# Patient Record
Sex: MALE | Race: WHITE | Employment: OTHER | URBAN - METROPOLITAN AREA
[De-identification: names, ages, dates, MRNs, and addresses within clinical notes are randomized per-mention and may not be internally consistent; named-entity substitution may affect disease eponyms.]

---

## 2021-04-01 ENCOUNTER — HOSPITAL ENCOUNTER (EMERGENCY)
Facility: CLINIC | Age: 56
Discharge: HOME | End: 2021-04-01
Attending: PHYSICIAN ASSISTANT | Admitting: PHYSICIAN ASSISTANT
Payer: COMMERCIAL

## 2021-04-01 VITALS
DIASTOLIC BLOOD PRESSURE: 97 MMHG | WEIGHT: 202.3 LBS | RESPIRATION RATE: 16 BRPM | HEART RATE: 89 BPM | TEMPERATURE: 98.6 F | SYSTOLIC BLOOD PRESSURE: 141 MMHG | OXYGEN SATURATION: 99 %

## 2021-04-01 DIAGNOSIS — M77.8 TENDINITIS OF LEFT WRIST: ICD-10-CM

## 2021-04-01 PROCEDURE — 99283 EMERGENCY DEPT VISIT LOW MDM: CPT | Performed by: PHYSICIAN ASSISTANT

## 2021-04-01 PROCEDURE — 99284 EMERGENCY DEPT VISIT MOD MDM: CPT | Performed by: PHYSICIAN ASSISTANT

## 2021-04-01 RX ORDER — ATENOLOL 25 MG/1
25 TABLET ORAL
COMMUNITY
Start: 2020-06-23

## 2021-04-01 NOTE — DISCHARGE INSTRUCTIONS
It was a pleasure working with you today!  I hope your condition improves rapidly!     Please rest your wrist as the tendons are inflamed.    Please ice the painful area for 15 minutes every 2 hours for the next few days.  Use ibuprofen 600 mg every 6 hours with food for the next 4-5 days and then as needed after that.  Wear your brace at all times in the emergency department other than when showering for the next week to support the wrist.  You can take the brace off 3-4 times per day to do some range of motion exercises with your wrist.  The orthopedic clinic will be calling you to set up a recheck appointment in a week.

## 2021-04-01 NOTE — ED TRIAGE NOTES
Pt presents with left wrist pain. No known injury. Pt states started 2 weeks ago now worsening pain 10. CMS intact. Pt having difficulty moving wrist.

## 2021-04-01 NOTE — LETTER
April 1, 2021      To Whom It May Concern:      Angel Ch was seen in our Emergency Department today, 04/01/21.  I expect his condition to improve over the next 1-2 weeks.  He may return to work on 4/1/2021 with the following restrictions:    *Limited gripping and grasping with his left hand.  *He must wear his wrist brace at all time for the next 1 week.  *No lifting over 5 pounds for the next 1 week.      Sincerely,            Noe Roy PA-C

## 2021-04-01 NOTE — ED PROVIDER NOTES
History     Chief Complaint   Patient presents with     Wrist Pain     Left wrist pain. No known injury.      HPI  Angel Ch is a 55 year old male who presents for evaluation of left wrist discomfort for the past 2 weeks.  Worsening over time.  Worse with any movement.  Does not really have much pain at rest.  Right-hand-dominant.  He works as a .  Currently rates his pain 8 on a scale of 10 and throbbing in nature.  Has not attempted any treatment to this point.  Rest seems to make it better.  Has never had this type issue before.  Denies any numbness or tingling of the hand.  Denies any neck pain or radicular type symptoms.        Allergies:  Allergies   Allergen Reactions     No Known Allergies        Problem List:    There are no active problems to display for this patient.       Past Medical History:    No past medical history on file.    Past Surgical History:    No past surgical history on file.    Family History:    No family history on file.    Social History:  Marital Status:  Single [1]  Social History     Tobacco Use     Smoking status: Not on file   Substance Use Topics     Alcohol use: Not on file     Drug use: Not on file        Medications:    atenolol (TENORMIN) 25 MG tablet  cholecalciferol 50 MCG (2000 UT) CAPS          Review of Systems   All other systems reviewed and are negative.      Physical Exam   BP: (!) 141/97  Pulse: 89  Temp: 98.6  F (37  C)  Resp: 16  Weight: 91.8 kg (202 lb 4.8 oz)  SpO2: 99 %      Physical Exam  Vitals signs and nursing note reviewed.   Constitutional:       General: He is not in acute distress.     Appearance: He is not diaphoretic.   HENT:      Head: Normocephalic and atraumatic.      Right Ear: External ear normal.      Left Ear: External ear normal.      Nose: Nose normal.      Mouth/Throat:      Pharynx: No oropharyngeal exudate.   Eyes:      General: No scleral icterus.        Right eye: No discharge.         Left eye: No discharge.       Conjunctiva/sclera: Conjunctivae normal.      Pupils: Pupils are equal, round, and reactive to light.   Neck:      Musculoskeletal: Normal range of motion and neck supple.      Thyroid: No thyromegaly.   Cardiovascular:      Rate and Rhythm: Normal rate and regular rhythm.      Heart sounds: Normal heart sounds. No murmur.   Pulmonary:      Effort: Pulmonary effort is normal. No respiratory distress.      Breath sounds: Normal breath sounds. No wheezing or rales.   Chest:      Chest wall: No tenderness.   Abdominal:      General: Bowel sounds are normal. There is no distension.      Palpations: Abdomen is soft. There is no mass.      Tenderness: There is no abdominal tenderness. There is no guarding or rebound.   Musculoskeletal: Normal range of motion.         General: No tenderness or deformity.      Comments: Left arm with no obvious abnormality.  Normal active and passive range of motion.  Has pain with forced extension and supination of the wrist.  No crepitus.  No wrist or hand discomfort.  Negative Phalen's and Tinel's sign.  Negative Finkelstein's test.  Elbow exam is completely normal.  Normal range of motion and no tenderness.  Shoulder exam is completely normal with normal range of motion and no tenderness as well.  Cervical range of motion completely normal.  No pain with axial loading.  Nontender to palpation to the paravertebral musculature as well as the spinous processes.  No upper extremity weakness identified.  Sensation intact to light touch throughout.  Radial and ulnar pulses are 2+.   Lymphadenopathy:      Cervical: No cervical adenopathy.   Skin:     General: Skin is warm and dry.      Capillary Refill: Capillary refill takes less than 2 seconds.      Findings: No erythema or rash.   Neurological:      Mental Status: He is alert and oriented to person, place, and time.      Cranial Nerves: No cranial nerve deficit.   Psychiatric:         Behavior: Behavior normal.         Thought Content:  Thought content normal.         ED Course        Procedures               Critical Care time:  none               No results found for this or any previous visit (from the past 24 hour(s)).    Medications - No data to display    Assessments & Plan (with Medical Decision Making)  Tendinitis of left wrist     55 year old male presents for evaluation of a nontraumatic wrist pain gradually worsening over the past 2 weeks.  Patient describes himself as a .  Predominantly right-hand dominant.  On exam he does not have any palpable tenderness to suggest underlying fracture.  There is no obvious edema or bruising.  Normal passive and active range of motion.  Pain elicited with forced extension and supination at the wrist.  Negative Tinel's, Phalen's, and Finkelstein's signs.  Discussed tendinitis diagnosis.  Rest, ice, compression, and elevation discussed.  Wrist brace dispensed.  Note provided for work to not  or grasp or lift anything over 5 pounds for the next 1 week.  Follow-up with sports medicine in 1 week.  Ibuprofen 600 mg every 6 hours on a regular basis with food for 3-4 days and then as needed afterwards.  ED return instructions reviewed.  Patient in agreement.     I have reviewed the nursing notes.    I have reviewed the findings, diagnosis, plan and need for follow up with the patient.       Discharge Medication List as of 4/1/2021 12:35 PM          Final diagnoses:   Tendinitis of left wrist     Disclaimer: This note consists of symbols derived from keyboarding, dictation and/or voice recognition software. As a result, there may be errors in the script that have gone undetected. Please consider this when interpreting information found in this chart.      4/1/2021   [facility] EMERGENCY DEPT     Noe Roy PA-C  04/01/21 1839

## 2021-04-06 NOTE — PROGRESS NOTES
Sports Medicine Clinic Visit    PCP: No Ref-Primary, Physician    CC: Patient presents with:  Left Wrist - Pain      HPI:  Angel Ch is a 55 year old male who is seen as an ER referral.   He notes left wrist pain that started about 3 weeks ago. No injury noted. He notes pain over the volar aspect of the left wrist and forearm.  He rates the pain at a 9/10 at its worst and a 1/10 currently. If he tries to , pain in the forearm.  Symptoms are relieved with bracing. Has been bracing since ER visit on 4/1/2021. He endorses weakness.   He denies popping, grinding and catching. He notes difficulty with gripping an object. Some numbness and tingling into hand.  He feels like he has improved since the ER visit.      He is self employed in .     History reviewed. No pertinent past surgical/medical/family/social history other than as mentioned in HPI.  Review of systems negative except per HPI.        Social History     Socioeconomic History     Marital status: Single     Spouse name: Not on file     Number of children: Not on file     Years of education: Not on file     Highest education level: Not on file   Occupational History     Not on file   Social Needs     Financial resource strain: Not on file     Food insecurity     Worry: Not on file     Inability: Not on file     Transportation needs     Medical: Not on file     Non-medical: Not on file   Tobacco Use     Smoking status: Not on file   Substance and Sexual Activity     Alcohol use: Not on file     Drug use: Not on file     Sexual activity: Not on file   Lifestyle     Physical activity     Days per week: Not on file     Minutes per session: Not on file     Stress: Not on file   Relationships     Social connections     Talks on phone: Not on file     Gets together: Not on file     Attends Synagogue service: Not on file     Active member of club or organization: Not on file     Attends meetings of clubs or organizations: Not on file      "Relationship status: Not on file     Intimate partner violence     Fear of current or ex partner: Not on file     Emotionally abused: Not on file     Physically abused: Not on file     Forced sexual activity: Not on file   Other Topics Concern     Not on file   Social History Narrative     Not on file         Current Outpatient Medications   Medication     atenolol (TENORMIN) 25 MG tablet     cholecalciferol 50 MCG (2000 UT) CAPS     No current facility-administered medications for this visit.      Allergies   Allergen Reactions     No Known Allergies          Objective:  /88 (BP Location: Right arm, Patient Position: Sitting, Cuff Size: Adult Regular)   Ht 1.803 m (5' 11\")   Wt 93 kg (205 lb)   BMI 28.59 kg/m      General: Alert and in no distress    Head: Normocephalic, atraumatic  Eyes: no scleral icterus or conjunctival erythema   Skin: no erythema, petechiae, or jaundice  Resp: normal respiratory effort without conversational dyspnea   Psych: normal mood and affect      Musculoskeletal:    Bilateral Wrist and Hand exam    Inspection:       No swelling, bruising or deformity bilaterally    Tenderness:  None    ROM:       Full and symmetric active range of motion of the forearm, wrist and digits bilaterally    Strength:  Forearm supination 5/5 bilaterally  Forearm pronation 5/5 bilaterally  Wrist extension 5/5 bilaterally  Wrist flexion 5/5 bilaterally   strength 5/5 bilaterally  Finger abduction 5/5 bilaterally    Neurovascular:       2+ radial pulses bilaterally       Altered sensation to light touch over the left 2nd-5th fingertips      Radiology:  X-rays ordered and independent visualization of images performed and reviewed with Angel.    Recent Results (from the past 744 hour(s))   XR Wrist Left G/E 3 Views    Narrative    XR WRIST LEFT G/E 3 VIEWS   4/8/2021 11:40 AM     HISTORY:  Tendinitis of left wrist      Impression    IMPRESSION: Unremarkable exam.    DANGELO PATEL MD "         Assessment:  1. Tendinitis of left wrist        Plan:  Discussed the assessment with the patient and developed a plan together:  -Suspect pain is due to tendinitis likely from overuse.  He is improving.    -Wrist brace as needed for comfort and support.    -Ice or heat 15-20 minutes as needed (Avoid sleeping on a heating pad or ice)  -Patient's preferred over the counter medications as directed on packaging as needed for pain or soreness.  Please take ibuprofen with food.   -Over the counter lidocaine cream or Voltaren gel as needed.    -Avoid aggravating activities.    -Follow up as needed if symptoms fail to improve or worsen.  Please call with questions or concerns.      Lilliana Rodriguez MD, CAQ Sports Medicine  [facility]

## 2021-04-08 ENCOUNTER — ANCILLARY PROCEDURE (OUTPATIENT)
Dept: GENERAL RADIOLOGY | Facility: CLINIC | Age: 56
End: 2021-04-08
Attending: PHYSICAL MEDICINE & REHABILITATION
Payer: COMMERCIAL

## 2021-04-08 ENCOUNTER — OFFICE VISIT (OUTPATIENT)
Dept: ORTHOPEDICS | Facility: CLINIC | Age: 56
End: 2021-04-08
Attending: PHYSICIAN ASSISTANT
Payer: COMMERCIAL

## 2021-04-08 VITALS
WEIGHT: 205 LBS | DIASTOLIC BLOOD PRESSURE: 88 MMHG | SYSTOLIC BLOOD PRESSURE: 128 MMHG | HEIGHT: 71 IN | BODY MASS INDEX: 28.7 KG/M2

## 2021-04-08 DIAGNOSIS — M77.8 TENDINITIS OF LEFT WRIST: ICD-10-CM

## 2021-04-08 PROCEDURE — 73110 X-RAY EXAM OF WRIST: CPT | Mod: TC | Performed by: RADIOLOGY

## 2021-04-08 PROCEDURE — 99203 OFFICE O/P NEW LOW 30 MIN: CPT | Performed by: PHYSICAL MEDICINE & REHABILITATION

## 2021-04-08 ASSESSMENT — MIFFLIN-ST. JEOR: SCORE: 1797

## 2021-04-08 NOTE — PATIENT INSTRUCTIONS
-Wrist brace as needed for comfort and support.    -Ice or heat 15-20 minutes as needed (Avoid sleeping on a heating pad or ice)  -Patient's preferred over the counter medications as directed on packaging as needed for pain or soreness.  Please take ibuprofen with food.   -Over the counter lidocaine cream or Voltaren gel as needed.    -Avoid aggravating activities.    -Follow up as needed if symptoms fail to improve or worsen.  Please call with questions or concerns.

## 2021-04-08 NOTE — LETTER
4/8/2021         RE: Angel Ch  [address]  [facility] [facility] [address]        Dear Colleague,    Thank you for referring your patient, Angel Ch, to the [facility] [place]. Please see a copy of my visit note below.    Sports Medicine Clinic Visit    PCP: No Ref-Primary, Physician    CC: Patient presents with:  Left Wrist - Pain      HPI:  Angel Ch is a 55 year old male who is seen as an ER referral.   He notes left wrist pain that started about 3 weeks ago. No injury noted. He notes pain over the volar aspect of the left wrist and forearm.  He rates the pain at a 9/10 at its worst and a 1/10 currently. If he tries to , pain in the forearm.  Symptoms are relieved with bracing. Has been bracing since ER visit on 4/1/2021. He endorses weakness.   He denies popping, grinding and catching. He notes difficulty with gripping an object. Some numbness and tingling into hand.  He feels like he has improved since the ER visit.      He is self employed in .     History reviewed. No pertinent past surgical/medical/family/social history other than as mentioned in HPI.  Review of systems negative except per HPI.        Social History     Socioeconomic History     Marital status: Single     Spouse name: Not on file     Number of children: Not on file     Years of education: Not on file     Highest education level: Not on file   Occupational History     Not on file   Social Needs     Financial resource strain: Not on file     Food insecurity     Worry: Not on file     Inability: Not on file     Transportation needs     Medical: Not on file     Non-medical: Not on file   Tobacco Use     Smoking status: Not on file   Substance and Sexual Activity     Alcohol use: Not on file     Drug use: Not on file     Sexual activity: Not on file   Lifestyle     Physical activity     Days per week: Not on file     Minutes per session: Not on file     Stress: Not on file  "  Relationships     Social connections     Talks on phone: Not on file     Gets together: Not on file     Attends Islam service: Not on file     Active member of club or organization: Not on file     Attends meetings of clubs or organizations: Not on file     Relationship status: Not on file     Intimate partner violence     Fear of current or ex partner: Not on file     Emotionally abused: Not on file     Physically abused: Not on file     Forced sexual activity: Not on file   Other Topics Concern     Not on file   Social History Narrative     Not on file         Current Outpatient Medications   Medication     atenolol (TENORMIN) 25 MG tablet     cholecalciferol 50 MCG (2000 UT) CAPS     No current facility-administered medications for this visit.      Allergies   Allergen Reactions     No Known Allergies          Objective:  /88 (BP Location: Right arm, Patient Position: Sitting, Cuff Size: Adult Regular)   Ht 1.803 m (5' 11\")   Wt 93 kg (205 lb)   BMI 28.59 kg/m      General: Alert and in no distress    Head: Normocephalic, atraumatic  Eyes: no scleral icterus or conjunctival erythema   Skin: no erythema, petechiae, or jaundice  Resp: normal respiratory effort without conversational dyspnea   Psych: normal mood and affect      Musculoskeletal:    Bilateral Wrist and Hand exam    Inspection:       No swelling, bruising or deformity bilaterally    Tenderness:  None    ROM:       Full and symmetric active range of motion of the forearm, wrist and digits bilaterally    Strength:  Forearm supination 5/5 bilaterally  Forearm pronation 5/5 bilaterally  Wrist extension 5/5 bilaterally  Wrist flexion 5/5 bilaterally   strength 5/5 bilaterally  Finger abduction 5/5 bilaterally    Neurovascular:       2+ radial pulses bilaterally       Altered sensation to light touch over the left 2nd-5th fingertips      Radiology:  X-rays ordered and independent visualization of images performed and reviewed with Angel.  "   Recent Results (from the past 744 hour(s))   XR Wrist Left G/E 3 Views    Narrative    XR WRIST LEFT G/E 3 VIEWS   4/8/2021 11:40 AM     HISTORY:  Tendinitis of left wrist      Impression    IMPRESSION: Unremarkable exam.    DANGELO PATEL MD         Assessment:  1. Tendinitis of left wrist        Plan:  Discussed the assessment with the patient and developed a plan together:  -Suspect pain is due to tendinitis likely from overuse.  He is improving.    -Wrist brace as needed for comfort and support.    -Ice or heat 15-20 minutes as needed (Avoid sleeping on a heating pad or ice)  -Patient's preferred over the counter medications as directed on packaging as needed for pain or soreness.  Please take ibuprofen with food.   -Over the counter lidocaine cream or Voltaren gel as needed.    -Avoid aggravating activities.    -Follow up as needed if symptoms fail to improve or worsen.  Please call with questions or concerns.      Lilliana Rodriguez MD, [facility]  [facility]        Again, thank you for allowing me to participate in the care of your patient.        Sincerely,        Lakeisha Rodriguez MD

## 2021-12-02 ENCOUNTER — HOSPITAL ENCOUNTER (EMERGENCY)
Facility: CLINIC | Age: 56
Discharge: HOME | End: 2021-12-02
Attending: EMERGENCY MEDICINE | Admitting: EMERGENCY MEDICINE
Payer: COMMERCIAL

## 2021-12-02 ENCOUNTER — APPOINTMENT (OUTPATIENT)
Dept: GENERAL RADIOLOGY | Facility: CLINIC | Age: 56
End: 2021-12-02
Attending: EMERGENCY MEDICINE
Payer: COMMERCIAL

## 2021-12-02 VITALS
HEART RATE: 80 BPM | SYSTOLIC BLOOD PRESSURE: 147 MMHG | DIASTOLIC BLOOD PRESSURE: 99 MMHG | OXYGEN SATURATION: 97 % | BODY MASS INDEX: 27.58 KG/M2 | WEIGHT: 197 LBS | TEMPERATURE: 99.2 F | HEIGHT: 71 IN

## 2021-12-02 DIAGNOSIS — U07.1 INFECTION DUE TO 2019 NOVEL CORONAVIRUS: ICD-10-CM

## 2021-12-02 LAB
ALBUMIN SERPL-MCNC: 3.4 G/DL (ref 3.4–5)
ALP SERPL-CCNC: 76 U/L (ref 40–150)
ALT SERPL W P-5'-P-CCNC: 64 U/L (ref 0–70)
ANION GAP SERPL CALCULATED.3IONS-SCNC: 6 MMOL/L (ref 3–14)
AST SERPL W P-5'-P-CCNC: 32 U/L (ref 0–45)
BASE EXCESS BLDV CALC-SCNC: 3 MMOL/L (ref -7.7–1.9)
BASOPHILS # BLD AUTO: 0 10E3/UL (ref 0–0.2)
BASOPHILS NFR BLD AUTO: 0 %
BILIRUB SERPL-MCNC: 1.2 MG/DL (ref 0.2–1.3)
BUN SERPL-MCNC: 8 MG/DL (ref 7–30)
CALCIUM SERPL-MCNC: 8.4 MG/DL (ref 8.5–10.1)
CHLORIDE BLD-SCNC: 99 MMOL/L (ref 94–109)
CO2 SERPL-SCNC: 28 MMOL/L (ref 20–32)
CREAT SERPL-MCNC: 0.88 MG/DL (ref 0.66–1.25)
CRP SERPL-MCNC: 68.2 MG/L (ref 0–8)
EOSINOPHIL # BLD AUTO: 0 10E3/UL (ref 0–0.7)
EOSINOPHIL NFR BLD AUTO: 0 %
ERYTHROCYTE [DISTWIDTH] IN BLOOD BY AUTOMATED COUNT: 11.2 % (ref 10–15)
ERYTHROCYTE [SEDIMENTATION RATE] IN BLOOD BY WESTERGREN METHOD: 9 MM/HR (ref 0–20)
FLUAV RNA SPEC QL NAA+PROBE: NEGATIVE
FLUBV RNA RESP QL NAA+PROBE: NEGATIVE
GFR SERPL CREATININE-BSD FRML MDRD: >90 ML/MIN/1.73M2
GLUCOSE BLD-MCNC: 110 MG/DL (ref 70–99)
HCO3 BLDV-SCNC: 30 MMOL/L (ref 21–28)
HCT VFR BLD AUTO: 47.6 % (ref 40–53)
HGB BLD-MCNC: 16.9 G/DL (ref 13.3–17.7)
IMM GRANULOCYTES # BLD: 0 10E3/UL
IMM GRANULOCYTES NFR BLD: 0 %
LACTATE SERPL-SCNC: 1.9 MMOL/L (ref 0.7–2)
LYMPHOCYTES # BLD AUTO: 0.9 10E3/UL (ref 0.8–5.3)
LYMPHOCYTES NFR BLD AUTO: 12 %
MCH RBC QN AUTO: 33.8 PG (ref 26.5–33)
MCHC RBC AUTO-ENTMCNC: 35.5 G/DL (ref 31.5–36.5)
MCV RBC AUTO: 95 FL (ref 78–100)
MONOCYTES # BLD AUTO: 0.9 10E3/UL (ref 0–1.3)
MONOCYTES NFR BLD AUTO: 12 %
NEUTROPHILS # BLD AUTO: 5.8 10E3/UL (ref 1.6–8.3)
NEUTROPHILS NFR BLD AUTO: 76 %
NRBC # BLD AUTO: 0 10E3/UL
NRBC BLD AUTO-RTO: 0 /100
O2/TOTAL GAS SETTING VFR VENT: 21 %
PCO2 BLDV: 50 MM HG (ref 40–50)
PH BLDV: 7.38 [PH] (ref 7.32–7.43)
PLATELET # BLD AUTO: 229 10E3/UL (ref 150–450)
PO2 BLDV: 21 MM HG (ref 25–47)
POTASSIUM BLD-SCNC: 4.4 MMOL/L (ref 3.4–5.3)
PROT SERPL-MCNC: 7.5 G/DL (ref 6.8–8.8)
RBC # BLD AUTO: 5 10E6/UL (ref 4.4–5.9)
SARS-COV-2 RNA RESP QL NAA+PROBE: POSITIVE
SODIUM SERPL-SCNC: 133 MMOL/L (ref 133–144)
WBC # BLD AUTO: 7.6 10E3/UL (ref 4–11)

## 2021-12-02 PROCEDURE — 82803 BLOOD GASES ANY COMBINATION: CPT | Performed by: EMERGENCY MEDICINE

## 2021-12-02 PROCEDURE — 82040 ASSAY OF SERUM ALBUMIN: CPT | Performed by: EMERGENCY MEDICINE

## 2021-12-02 PROCEDURE — 99284 EMERGENCY DEPT VISIT MOD MDM: CPT | Mod: 25 | Performed by: EMERGENCY MEDICINE

## 2021-12-02 PROCEDURE — 85025 COMPLETE CBC W/AUTO DIFF WBC: CPT | Performed by: EMERGENCY MEDICINE

## 2021-12-02 PROCEDURE — 85652 RBC SED RATE AUTOMATED: CPT | Performed by: EMERGENCY MEDICINE

## 2021-12-02 PROCEDURE — 99284 EMERGENCY DEPT VISIT MOD MDM: CPT | Performed by: EMERGENCY MEDICINE

## 2021-12-02 PROCEDURE — 36415 COLL VENOUS BLD VENIPUNCTURE: CPT | Performed by: EMERGENCY MEDICINE

## 2021-12-02 PROCEDURE — 71045 X-RAY EXAM CHEST 1 VIEW: CPT

## 2021-12-02 PROCEDURE — 258N000003 HC RX IP 258 OP 636: Performed by: EMERGENCY MEDICINE

## 2021-12-02 PROCEDURE — 86140 C-REACTIVE PROTEIN: CPT | Performed by: EMERGENCY MEDICINE

## 2021-12-02 PROCEDURE — C9803 HOPD COVID-19 SPEC COLLECT: HCPCS | Performed by: EMERGENCY MEDICINE

## 2021-12-02 PROCEDURE — 87636 SARSCOV2 & INF A&B AMP PRB: CPT | Performed by: EMERGENCY MEDICINE

## 2021-12-02 PROCEDURE — 83605 ASSAY OF LACTIC ACID: CPT | Performed by: EMERGENCY MEDICINE

## 2021-12-02 PROCEDURE — 96360 HYDRATION IV INFUSION INIT: CPT | Performed by: EMERGENCY MEDICINE

## 2021-12-02 RX ORDER — DEXAMETHASONE 6 MG/1
6 TABLET ORAL DAILY
Qty: 7 TABLET | Refills: 0 | Status: SHIPPED | OUTPATIENT
Start: 2021-12-02

## 2021-12-02 RX ORDER — SODIUM CHLORIDE 9 MG/ML
INJECTION, SOLUTION INTRAVENOUS CONTINUOUS
Status: DISCONTINUED | OUTPATIENT
Start: 2021-12-02 | End: 2021-12-02 | Stop reason: HOSPADM

## 2021-12-02 RX ADMIN — SODIUM CHLORIDE 1000 ML: 9 INJECTION, SOLUTION INTRAVENOUS at 11:39

## 2021-12-02 ASSESSMENT — MIFFLIN-ST. JEOR: SCORE: 1760.72

## 2021-12-02 NOTE — DISCHARGE INSTRUCTIONS
Return to the emergency department if you develop new or worsening symptoms.  Get plenty of rest and drink plenty of water.  Take the Decadron as directed as this might help with inflammation of the lungs.  Quarantine yourself until your symptoms start to resolve.  If you start getting very short of breath return to the emergency department.  I hope you get better quickly.  Was a pleasure to meet you.

## 2021-12-02 NOTE — ED PROVIDER NOTES
"  History     Chief Complaint   Patient presents with     Fever     HPI  Angel Ch is a 55 year old male who presents to the emergency department secondary to concerns for fever and nonproductive cough.  He started a few days ago.  Fever max temperature was 100.8.    He has been sick for a week and a half.  He has had headache, body aches, fever, change in taste ability, fatigue, non productive cough, mild sob.   No known contact with covid.  Headache and body aches are minimal right now.     He has taken nyquil. Not sleeping well.  Poor appetite. Urine output is decreased and he feels dehydrated.     Allergies:  Allergies   Allergen Reactions     No Known Allergies        Problem List:    There are no problems to display for this patient.       Past Medical History:    No past medical history on file.    Past Surgical History:    No past surgical history on file.    Family History:    No family history on file.    Social History:  Marital Status:  Single [1]  Social History     Tobacco Use     Smoking status: Not on file     Smokeless tobacco: Not on file   Substance Use Topics     Alcohol use: Not on file     Drug use: Not on file        Medications:    atenolol (TENORMIN) 25 MG tablet  cholecalciferol 50 MCG (2000 UT) CAPS  dexamethasone (DECADRON) 6 MG tablet          Review of Systems   All other systems reviewed and are negative.      Physical Exam   BP: (!) 147/99  Pulse: 80  Temp: 99.2  F (37.3  C)  Height: 180.3 cm (5' 11\")  Weight: 89.4 kg (197 lb)  SpO2: 98 %      Physical Exam  Vitals and nursing note reviewed. Exam conducted with a chaperone present.   Constitutional:       General: He is not in acute distress.     Appearance: He is well-developed. He is not diaphoretic.   HENT:      Head: Normocephalic and atraumatic.      Right Ear: External ear normal.      Left Ear: External ear normal.      Nose: Nose normal.      Mouth/Throat:      Mouth: Mucous membranes are moist.   Eyes:      General: No " scleral icterus.     Extraocular Movements: Extraocular movements intact.      Conjunctiva/sclera: Conjunctivae normal.      Pupils: Pupils are equal, round, and reactive to light.   Cardiovascular:      Rate and Rhythm: Normal rate and regular rhythm.      Pulses: Normal pulses.      Heart sounds: Normal heart sounds. No murmur heard.  No friction rub. No gallop.    Pulmonary:      Effort: Pulmonary effort is normal.      Breath sounds: Normal breath sounds. No stridor. No wheezing or rales.   Musculoskeletal:         General: No deformity or signs of injury. Normal range of motion.      Cervical back: Normal range of motion and neck supple.      Right lower leg: No edema.      Left lower leg: No edema.   Skin:     General: Skin is warm and dry.      Findings: No rash.   Neurological:      Mental Status: He is alert and oriented to person, place, and time.   Psychiatric:         Mood and Affect: Mood normal.         ED Course        Procedures                Results for orders placed or performed during the hospital encounter of 12/02/21 (from the past 24 hour(s))   Lactic acid whole blood   Result Value Ref Range    Lactic Acid 1.9 0.7 - 2.0 mmol/L   Blood gas venous   Result Value Ref Range    pH Venous 7.38 7.32 - 7.43    pCO2 Venous 50 40 - 50 mm Hg    pO2 Venous 21 (L) 25 - 47 mm Hg    Bicarbonate Venous 30 (H) 21 - 28 mmol/L    Base Excess/Deficit (+/-) 3.0 (H) -7.7 - 1.9 mmol/L    FIO2 21    CBC with Platelets & Differential    Narrative    The following orders were created for panel order CBC with Platelets & Differential.  Procedure                               Abnormality         Status                     ---------                               -----------         ------                     CBC with platelets and d...[[id number]]  Abnormal            Final result                 Please view results for these tests on the individual orders.   Comprehensive metabolic panel   Result Value Ref Range     Sodium 133 133 - 144 mmol/L    Potassium 4.4 3.4 - 5.3 mmol/L    Chloride 99 94 - 109 mmol/L    Carbon Dioxide (CO2) 28 20 - 32 mmol/L    Anion Gap 6 3 - 14 mmol/L    Urea Nitrogen 8 7 - 30 mg/dL    Creatinine 0.88 0.66 - 1.25 mg/dL    Calcium 8.4 (L) 8.5 - 10.1 mg/dL    Glucose 110 (H) 70 - 99 mg/dL    Alkaline Phosphatase 76 40 - 150 U/L    AST 32 0 - 45 U/L    ALT 64 0 - 70 U/L    Protein Total 7.5 6.8 - 8.8 g/dL    Albumin 3.4 3.4 - 5.0 g/dL    Bilirubin Total 1.2 0.2 - 1.3 mg/dL    GFR Estimate >90 >60 mL/min/1.73m2   CRP inflammation   Result Value Ref Range    CRP Inflammation 68.2 (H) 0.0 - 8.0 mg/L   Erythrocyte sedimentation rate auto   Result Value Ref Range    Erythrocyte Sedimentation Rate 9 0 - 20 mm/hr   CBC with platelets and differential   Result Value Ref Range    WBC Count 7.6 4.0 - 11.0 10e3/uL    RBC Count 5.00 4.40 - 5.90 10e6/uL    Hemoglobin 16.9 13.3 - 17.7 g/dL    Hematocrit 47.6 40.0 - 53.0 %    MCV 95 78 - 100 fL    MCH 33.8 (H) 26.5 - 33.0 pg    MCHC 35.5 31.5 - 36.5 g/dL    RDW 11.2 10.0 - 15.0 %    Platelet Count 229 150 - 450 10e3/uL    % Neutrophils 76 %    % Lymphocytes 12 %    % Monocytes 12 %    % Eosinophils 0 %    % Basophils 0 %    % Immature Granulocytes 0 %    NRBCs per 100 WBC 0 <1 /100    Absolute Neutrophils 5.8 1.6 - 8.3 10e3/uL    Absolute Lymphocytes 0.9 0.8 - 5.3 10e3/uL    Absolute Monocytes 0.9 0.0 - 1.3 10e3/uL    Absolute Eosinophils 0.0 0.0 - 0.7 10e3/uL    Absolute Basophils 0.0 0.0 - 0.2 10e3/uL    Absolute Immature Granulocytes 0.0 <=0.4 10e3/uL    Absolute NRBCs 0.0 10e3/uL   Symptomatic Influenza A/B & SARS-CoV2 (COVID-19) Virus PCR Multiplex Nasopharyngeal    Specimen: Nasopharyngeal; Swab   Result Value Ref Range    Influenza A PCR Negative Negative    Influenza B PCR Negative Negative    SARS CoV2 PCR Positive (A) Negative    Narrative    Testing was performed using the carlos SARS-CoV-2 & Influenza A/B Assay on the carlos Y[employer] System. This test should be  ordered for the detection of SARS-CoV-2 and influenza viruses in individuals who meet clinical and/or epidemiological criteria. Test performance is unknown in asymptomatic patients. This test is for in vitro diagnostic use under the FDA EUA for laboratories certified under CLIA to perform moderate and/or high complexity testing. This test has not been FDA cleared or approved. A negative result does not rule out the presence of PCR inhibitors in the specimen or target RNA in concentration below the limit of detection for the assay. If only one viral target is positive but coinfection with multiple targets is suspected, the sample should be re-tested with another FDA cleared, approved or authorized test, if coinfection would change clinical management. [facility] are certified under the Clinical Laboratory Improvement Amendments of 1988 (CLIA-88) as  qualified to perform moderate and/or high complexity laboratory testing.   XR Chest Port 1 View    Narrative    CHEST ONE VIEW PORTABLE  12/2/2021 11:11 AM     HISTORY: Cough. Shortness of breath.     COMPARISON: None.    FINDINGS:  Very subtle bilateral prominent pulmonary markings could  represent vascular congestion or subtle pulmonary infiltrates (as can  be seen with COVID 19 pneumonia). No focal lobar pneumonia is seen. No  pneumothorax, significant pleural fluid collection, or acute osseous  fracture. Heart size and mediastinum are grossly within normal limits.      Impression    IMPRESSION: Subtle prominent pulmonary markings bilaterally could  represent mild vascular congestion versus subtle infiltrates as can be  seen with COVID 19 pneumonia. No other evidence of acute  cardiopulmonary disease is seen.       Medications   0.9% sodium chloride BOLUS (0 mLs Intravenous Stopped 12/2/21 1230)     Followed by   sodium chloride 0.9% infusion (has no administration in time range)       Assessments & Plan (with Medical Decision Making)  COVID-19.   The patient is 14 days into his illness so far does not need any oxygen.  I think that is a good sign.  I discussed options with the patient including giving IV fluids given he feels dehydrated.  He wanted to proceed with that.  Treatment options were discussed as well.  He would like to try Decadron.  A prescription was sent to his pharmacy.  Patient was discharged home in stable condition.  Return to ER precautions of fall precautions discussed.     I have reviewed the nursing notes.    I have reviewed the findings, diagnosis, plan and need for follow up with the patient.      Discharge Medication List as of 12/2/2021 12:30 PM      START taking these medications    Details   dexamethasone (DECADRON) 6 MG tablet Take 1 tablet (6 mg) by mouth daily, Disp-7 tablet, R-0, E-Prescribe             Final diagnoses:   Infection due to 2019 novel coronavirus       12/2/2021   [facility] EMERGENCY DEPT     Pablo Man MD  12/02/21 9042

## 2021-12-02 NOTE — ED TRIAGE NOTES
Patient presents with concerns for fever for several days, 100-100.8 per patient, He has non productive cough. Radha Max RN

## 2021-12-03 ENCOUNTER — PATIENT OUTREACH (OUTPATIENT)
Dept: CARE COORDINATION | Facility: CLINIC | Age: 56
End: 2021-12-03
Payer: COMMERCIAL

## 2021-12-03 NOTE — PROGRESS NOTES
Pt on home virtual monitoring program for COVID-19, call made to do assessment, verify follow-up appt and offer care coordination, no answer.     CC RN left a generic message statin. Asked patient to make a virtual visit in the next one to two days by calling [phone] and that that line also doubles as a nurse triage line should the patient have questions or concerns.   2. Encouraged the patient to participate in the [facility] Loop referral sent to either their smartphone or personal email.     Chantelle Anguiano RN, BSN, PHN Care Coordinator

## 2022-02-22 DIAGNOSIS — E55.9 AVITAMINOSIS D: Primary | ICD-10-CM

## 2022-03-01 ENCOUNTER — LAB (OUTPATIENT)
Dept: LAB | Facility: CLINIC | Age: 57
End: 2022-03-01
Payer: COMMERCIAL

## 2022-03-01 DIAGNOSIS — E55.9 AVITAMINOSIS D: ICD-10-CM

## 2022-03-01 LAB
ALBUMIN SERPL-MCNC: 3.9 G/DL (ref 3.4–5)
ALP SERPL-CCNC: 77 U/L (ref 40–150)
ALT SERPL W P-5'-P-CCNC: 73 U/L (ref 0–70)
ANION GAP SERPL CALCULATED.3IONS-SCNC: 3 MMOL/L (ref 3–14)
AST SERPL W P-5'-P-CCNC: 25 U/L (ref 0–45)
BASOPHILS # BLD AUTO: 0 10E3/UL (ref 0–0.2)
BASOPHILS NFR BLD AUTO: 0 %
BILIRUB SERPL-MCNC: 1.7 MG/DL (ref 0.2–1.3)
BUN SERPL-MCNC: 12 MG/DL (ref 7–30)
CALCIUM SERPL-MCNC: 8.5 MG/DL (ref 8.5–10.1)
CHLORIDE BLD-SCNC: 111 MMOL/L (ref 94–109)
CHOLEST SERPL-MCNC: 214 MG/DL
CO2 SERPL-SCNC: 26 MMOL/L (ref 20–32)
CREAT SERPL-MCNC: 0.84 MG/DL (ref 0.66–1.25)
EOSINOPHIL # BLD AUTO: 0.1 10E3/UL (ref 0–0.7)
EOSINOPHIL NFR BLD AUTO: 1 %
ERYTHROCYTE [DISTWIDTH] IN BLOOD BY AUTOMATED COUNT: 12.1 % (ref 10–15)
FASTING STATUS PATIENT QL REPORTED: YES
GFR SERPL CREATININE-BSD FRML MDRD: >90 ML/MIN/1.73M2
GLUCOSE BLD-MCNC: 110 MG/DL (ref 70–99)
HCT VFR BLD AUTO: 48.3 % (ref 40–53)
HDLC SERPL-MCNC: 46 MG/DL
HGB BLD-MCNC: 16.8 G/DL (ref 13.3–17.7)
IMM GRANULOCYTES # BLD: 0 10E3/UL
IMM GRANULOCYTES NFR BLD: 1 %
LDLC SERPL CALC-MCNC: 136 MG/DL
LYMPHOCYTES # BLD AUTO: 1.2 10E3/UL (ref 0.8–5.3)
LYMPHOCYTES NFR BLD AUTO: 25 %
MCH RBC QN AUTO: 33.9 PG (ref 26.5–33)
MCHC RBC AUTO-ENTMCNC: 34.8 G/DL (ref 31.5–36.5)
MCV RBC AUTO: 97 FL (ref 78–100)
MONOCYTES # BLD AUTO: 0.5 10E3/UL (ref 0–1.3)
MONOCYTES NFR BLD AUTO: 10 %
NEUTROPHILS # BLD AUTO: 3.1 10E3/UL (ref 1.6–8.3)
NEUTROPHILS NFR BLD AUTO: 63 %
NONHDLC SERPL-MCNC: 168 MG/DL
NRBC # BLD AUTO: 0 10E3/UL
NRBC BLD AUTO-RTO: 0 /100
PLATELET # BLD AUTO: 199 10E3/UL (ref 150–450)
POTASSIUM BLD-SCNC: 4 MMOL/L (ref 3.4–5.3)
PROT SERPL-MCNC: 7.1 G/DL (ref 6.8–8.8)
PSA SERPL-MCNC: 0.48 UG/L (ref 0–4)
RBC # BLD AUTO: 4.96 10E6/UL (ref 4.4–5.9)
SODIUM SERPL-SCNC: 140 MMOL/L (ref 133–144)
TRIGL SERPL-MCNC: 159 MG/DL
WBC # BLD AUTO: 4.9 10E3/UL (ref 4–11)

## 2022-03-01 PROCEDURE — 80053 COMPREHEN METABOLIC PANEL: CPT

## 2022-03-01 PROCEDURE — 80061 LIPID PANEL: CPT

## 2022-03-01 PROCEDURE — 36415 COLL VENOUS BLD VENIPUNCTURE: CPT

## 2022-03-01 PROCEDURE — 82306 VITAMIN D 25 HYDROXY: CPT

## 2022-03-01 PROCEDURE — G0103 PSA SCREENING: HCPCS

## 2022-03-01 PROCEDURE — 85025 COMPLETE CBC W/AUTO DIFF WBC: CPT

## 2022-03-06 LAB
DEPRECATED CALCIDIOL+CALCIFEROL SERPL-MC: <50 UG/L (ref 20–75)
VITAMIN D2 SERPL-MCNC: <5 UG/L
VITAMIN D3 SERPL-MCNC: 45 UG/L